# Patient Record
Sex: MALE | Race: BLACK OR AFRICAN AMERICAN
[De-identification: names, ages, dates, MRNs, and addresses within clinical notes are randomized per-mention and may not be internally consistent; named-entity substitution may affect disease eponyms.]

---

## 2024-05-17 ENCOUNTER — DASHBOARD ENCOUNTERS (OUTPATIENT)
Age: 44
End: 2024-05-17

## 2024-05-17 ENCOUNTER — OFFICE VISIT (OUTPATIENT)
Dept: URBAN - METROPOLITAN AREA CLINIC 25 | Facility: CLINIC | Age: 44
End: 2024-05-17
Payer: COMMERCIAL

## 2024-05-17 VITALS
BODY MASS INDEX: 31.02 KG/M2 | TEMPERATURE: 98.1 F | HEIGHT: 72 IN | HEART RATE: 69 BPM | WEIGHT: 229 LBS | DIASTOLIC BLOOD PRESSURE: 84 MMHG | SYSTOLIC BLOOD PRESSURE: 144 MMHG

## 2024-05-17 DIAGNOSIS — K58.2 IRRITABLE BOWEL SYNDROME WITH BOTH CONSTIPATION AND DIARRHEA: ICD-10-CM

## 2024-05-17 DIAGNOSIS — R19.4 BOWEL HABIT CHANGES: ICD-10-CM

## 2024-05-17 DIAGNOSIS — R19.7 DIARRHEA, UNSPECIFIED TYPE: ICD-10-CM

## 2024-05-17 PROBLEM — 10743008: Status: ACTIVE | Noted: 2024-05-17

## 2024-05-17 PROCEDURE — 99204 OFFICE O/P NEW MOD 45 MIN: CPT

## 2024-05-22 ENCOUNTER — LAB OUTSIDE AN ENCOUNTER (OUTPATIENT)
Dept: URBAN - METROPOLITAN AREA CLINIC 25 | Facility: CLINIC | Age: 44
End: 2024-05-22

## 2024-05-24 LAB
ADENOVIRUS F 40/41: NOT DETECTED
CAMPYLOBACTER: NOT DETECTED
CLOSTRIDIUM DIFFICILE: NOT DETECTED
ENTAMOEBA HISTOLYTICA: NOT DETECTED
ENTEROAGGREGATIVE E.COLI: NOT DETECTED
ENTEROTOXIGENIC E.COLI: NOT DETECTED
ESCHERICHIA COLI O157: NOT DETECTED
GIARDIA LAMBLIA: NOT DETECTED
NOROVIRUS GI/GII: NOT DETECTED
ROTAVIRUS A: NOT DETECTED
SALMONELLA SPP.: NOT DETECTED
SHIGA-LIKE TOXIN PRODUCING E.COLI: NOT DETECTED
SHIGELLA SPP. / ENTEROINVASIVE E.COLI: NOT DETECTED
VIBRIO PARAHAEMOLYTICUS: NOT DETECTED
VIBRIO SPP.: NOT DETECTED
YERSINIA ENTEROCOLITICA: NOT DETECTED

## 2024-05-30 ENCOUNTER — LAB OUTSIDE AN ENCOUNTER (OUTPATIENT)
Dept: URBAN - METROPOLITAN AREA CLINIC 25 | Facility: CLINIC | Age: 44
End: 2024-05-30

## 2024-06-03 ENCOUNTER — TELEPHONE ENCOUNTER (OUTPATIENT)
Dept: URBAN - METROPOLITAN AREA CLINIC 25 | Facility: CLINIC | Age: 44
End: 2024-06-03

## 2024-06-20 ENCOUNTER — OFFICE VISIT (OUTPATIENT)
Dept: URBAN - METROPOLITAN AREA CLINIC 25 | Facility: CLINIC | Age: 44
End: 2024-06-20
Payer: COMMERCIAL

## 2024-06-20 VITALS
HEIGHT: 72 IN | DIASTOLIC BLOOD PRESSURE: 88 MMHG | SYSTOLIC BLOOD PRESSURE: 136 MMHG | BODY MASS INDEX: 31.51 KG/M2 | HEART RATE: 73 BPM | WEIGHT: 232.6 LBS | TEMPERATURE: 97.2 F

## 2024-06-20 DIAGNOSIS — R19.7 OVERFLOW DIARRHEA: ICD-10-CM

## 2024-06-20 DIAGNOSIS — K59.09 CHRONIC CONSTIPATION: ICD-10-CM

## 2024-06-20 DIAGNOSIS — K58.2 IRRITABLE BOWEL SYNDROME WITH BOTH CONSTIPATION AND DIARRHEA: ICD-10-CM

## 2024-06-20 PROCEDURE — 99214 OFFICE O/P EST MOD 30 MIN: CPT

## 2024-06-20 NOTE — HPI-OTHER HISTORIES
05/24 OV  Mr. Hannah is a 44y M, he presents today w/ complaints of changes in bowels, dx'd w/ constipation in October which was treated w/ Dulcolax prn, no constipation as of late, notes he has been having increased urgency and notes his bowels are looser in consistency, notes associated abdominal discomfort which gets better w/ a BM. Hx of hemorrhoids, which was not treated. Notes complete evacuation w/ bowels usually in the morning, urgency increased when drinking coffee, Stool consistency is mushy, but not watery. 2-3 daily BM's, and notes associated with bloating as well usually with meals and beer. Patient notes mucus in his stool as well. Deneis famhx of colon CA/polyps, BRBPR, melena, unintentional weight loss, N/V.

## 2024-06-20 NOTE — HPI-TODAY'S VISIT:
06/24 OV  GPP stool study and fecal calpro were both unremarkable, KUB reveals constipation with increased stool burden throughout the colon and phleboliths in the pelvic area which may be caused from straining or may be contributing to constipation, patient was advised miralax prn and fiber. The patient notes after starting fiber supplementation, he notes that his bowels are more formed and less loose, however, they are still pieced up. The patient also reports more incomplete evacuations. Abdominal discomfort improved, usually better w/ a BM, notes abdominal gas and bloating. LLQ discomfort radiates throughout the colon and can feel it migrate. The patient notes mucus is still present in stool, but better w/ fiber. Deneis BRBPR, melena, unintetnional weight loss, wieght loss.

## 2024-08-28 ENCOUNTER — OFFICE VISIT (OUTPATIENT)
Dept: URBAN - METROPOLITAN AREA CLINIC 25 | Facility: CLINIC | Age: 44
End: 2024-08-28
Payer: COMMERCIAL

## 2024-08-28 VITALS
DIASTOLIC BLOOD PRESSURE: 87 MMHG | HEART RATE: 75 BPM | WEIGHT: 230.6 LBS | SYSTOLIC BLOOD PRESSURE: 128 MMHG | HEIGHT: 72 IN | TEMPERATURE: 97.7 F | BODY MASS INDEX: 31.23 KG/M2

## 2024-08-28 DIAGNOSIS — K59.09 CHRONIC CONSTIPATION: ICD-10-CM

## 2024-08-28 DIAGNOSIS — Z83.719 FAMILY HISTORY OF COLONIC POLYPS: ICD-10-CM

## 2024-08-28 DIAGNOSIS — R10.32 LLQ ABDOMINAL PAIN: ICD-10-CM

## 2024-08-28 PROCEDURE — 99214 OFFICE O/P EST MOD 30 MIN: CPT

## 2024-08-28 NOTE — HPI-OTHER HISTORIES
06/24 OV  GPP stool study and fecal calpro were both unremarkable, KUB reveals constipation with increased stool burden throughout the colon and phleboliths in the pelvic area which may be caused from straining or may be contributing to constipation, patient was advised miralax prn and fiber. The patient notes after starting fiber supplementation, he notes that his bowels are more formed and less loose, however, they are still pieced up. The patient also reports more incomplete evacuations. Abdominal discomfort improved, usually better w/ a BM, notes abdominal gas and bloating. LLQ discomfort radiates throughout the colon and can feel it migrate. The patient notes mucus is still present in stool, but better w/ fiber. Deneis BRBPR, melena, unintetnional weight loss, wieght loss.  05/24 OV  Mr. Hannah is a 44y M, he presents today w/ complaints of changes in bowels, dx'd w/ constipation in October which was treated w/ Dulcolax prn, no constipation as of late, notes he has been having increased urgency and notes his bowels are looser in consistency, notes associated abdominal discomfort which gets better w/ a BM. Hx of hemorrhoids, which was not treated. Notes complete evacuation w/ bowels usually in the morning, urgency increased when drinking coffee, Stool consistency is mushy, but not watery. 2-3 daily BM's, and notes associated with bloating as well usually with meals and beer. Patient notes mucus in his stool as well. Deneis famhx of colon CA/polyps, BRBPR, melena, unintentional weight loss, N/V.

## 2024-08-28 NOTE — HPI-TODAY'S VISIT:
08/24 OV  The patient increased his use of Miralax and fiber and admits to more regular bowels, notes he had a period of increased bloating which has since resolved as he continued to take fiber supplementation. Currently having a BM daily, the patient has not had to take Miralax for weeks, currently taking fiber supplementation BID. Famhx of colon polyps w/ his after, no famhx of colon CA/polyps. Patient does report occasional LLQ abdominal discomfort still which resolves on its own or after a good BM. [place] stool type 4 currently. Karma BRBPR, melena, unintentional weight loss, N/V, upper abdominal issues,

## 2024-09-12 ENCOUNTER — OFFICE VISIT (OUTPATIENT)
Dept: URBAN - METROPOLITAN AREA SURGERY CENTER 20 | Facility: SURGERY CENTER | Age: 44
End: 2024-09-12
Payer: COMMERCIAL

## 2024-09-12 DIAGNOSIS — R19.4 CHANGE IN BOWEL HABITS: ICD-10-CM

## 2024-09-12 DIAGNOSIS — K92.1 ACUTE MELENA: ICD-10-CM

## 2024-09-12 DIAGNOSIS — K92.1 HEMATOCHEZIA: ICD-10-CM

## 2024-09-12 DIAGNOSIS — K64.8 OTHER HEMORRHOIDS: ICD-10-CM

## 2024-09-12 PROCEDURE — 00811 ANES LWR INTST NDSC NOS: CPT | Performed by: NURSE ANESTHETIST, CERTIFIED REGISTERED

## 2024-09-12 PROCEDURE — 45378 DIAGNOSTIC COLONOSCOPY: CPT | Performed by: INTERNAL MEDICINE

## 2024-12-04 ENCOUNTER — OFFICE VISIT (OUTPATIENT)
Dept: URBAN - METROPOLITAN AREA CLINIC 25 | Facility: CLINIC | Age: 44
End: 2024-12-04
Payer: COMMERCIAL

## 2024-12-04 VITALS
SYSTOLIC BLOOD PRESSURE: 130 MMHG | TEMPERATURE: 97.7 F | HEIGHT: 72 IN | DIASTOLIC BLOOD PRESSURE: 82 MMHG | HEART RATE: 67 BPM | WEIGHT: 233.2 LBS | BODY MASS INDEX: 31.59 KG/M2

## 2024-12-04 DIAGNOSIS — Z83.719 FAMILY HISTORY OF COLONIC POLYPS: ICD-10-CM

## 2024-12-04 DIAGNOSIS — K58.2 IRRITABLE BOWEL SYNDROME WITH BOTH CONSTIPATION AND DIARRHEA: ICD-10-CM

## 2024-12-04 DIAGNOSIS — R10.13 DYSPEPSIA: ICD-10-CM

## 2024-12-04 PROCEDURE — 99213 OFFICE O/P EST LOW 20 MIN: CPT

## 2024-12-04 RX ORDER — FAMOTIDINE 40 MG/1
1 TABLET AT BEDTIME TABLET, FILM COATED ORAL ONCE A DAY
Qty: 90 TABLET | Refills: 3 | OUTPATIENT
Start: 2024-12-04

## 2024-12-04 NOTE — HPI-OTHER HISTORIES
08/24 OV  The patient increased his use of Miralax and fiber and admits to more regular bowels, notes he had a period of increased bloating which has since resolved as he continued to take fiber supplementation. Currently having a BM daily, the patient has not had to take Miralax for weeks, currently taking fiber supplementation BID. Famhx of colon polyps w/ his after, no famhx of colon CA/polyps. Patient does report occasional LLQ abdominal discomfort still which resolves on its own or after a good BM. [place] stool type 4 currently. Deneis BRBPR, melena, unintentional weight loss, N/V, upper abdominal issues,  06/24 OV  GPP stool study and fecal calpro were both unremarkable, KUB reveals constipation with increased stool burden throughout the colon and phleboliths in the pelvic area which may be caused from straining or may be contributing to constipation, patient was advised miralax prn and fiber. The patient notes after starting fiber supplementation, he notes that his bowels are more formed and less loose, however, they are still pieced up. The patient also reports more incomplete evacuations. Abdominal discomfort improved, usually better w/ a BM, notes abdominal gas and bloating. LLQ discomfort radiates throughout the colon and can feel it migrate. The patient notes mucus is still present in stool, but better w/ fiber. Deneis BRBPR, melena, unintetnional weight loss, wieght loss.  05/24 OV  Mr. Hannah is a 44y M, he presents today w/ complaints of changes in bowels, dx'd w/ constipation in October which was treated w/ Dulcolax prn, no constipation as of late, notes he has been having increased urgency and notes his bowels are looser in consistency, notes associated abdominal discomfort which gets better w/ a BM. Hx of hemorrhoids, which was not treated. Notes complete evacuation w/ bowels usually in the morning, urgency increased when drinking coffee, Stool consistency is mushy, but not watery. 2-3 daily BM's, and notes associated with bloating as well usually with meals and beer. Patient notes mucus in his stool as well. Deneis famhx of colon CA/polyps, BRBPR, melena, unintentional weight loss, N/V.

## 2024-12-04 NOTE — HPI-TODAY'S VISIT:
12/24 OV  S/p colonoscopy unremarkable, internal hemorrhoids present, 5 year recall, LLQ abdominal disocmfort has resolved, bowels have regulated, currently having a daily BM, no hard stools/straining. Occasional loose stools. Deneis BRBPR, melena, unintentional weigth loss, SOB/CP, abdominal pain. The patient does reports mild dyspepsia in the mornings, no reflux, but admits to sour taste in the AM. Occurring 2-3 times a month, usually in the AM or after dinner.    Colon 2024  The examined portion of the ileum was normal. The entire examined colon is normal. Internal hemorrhoids. No specimens collected.

## 2025-02-23 ENCOUNTER — WEB ENCOUNTER (OUTPATIENT)
Dept: URBAN - METROPOLITAN AREA CLINIC 25 | Facility: CLINIC | Age: 45
End: 2025-02-23

## 2025-02-25 ENCOUNTER — LAB OUTSIDE AN ENCOUNTER (OUTPATIENT)
Dept: URBAN - METROPOLITAN AREA CLINIC 25 | Facility: CLINIC | Age: 45
End: 2025-02-25

## 2025-03-11 ENCOUNTER — TELEPHONE ENCOUNTER (OUTPATIENT)
Dept: URBAN - METROPOLITAN AREA CLINIC 6 | Facility: CLINIC | Age: 45
End: 2025-03-11

## 2025-06-04 ENCOUNTER — OFFICE VISIT (OUTPATIENT)
Dept: URBAN - METROPOLITAN AREA CLINIC 25 | Facility: CLINIC | Age: 45
End: 2025-06-04

## 2025-07-23 ENCOUNTER — OFFICE VISIT (OUTPATIENT)
Dept: URBAN - METROPOLITAN AREA CLINIC 25 | Facility: CLINIC | Age: 45
End: 2025-07-23

## 2025-07-23 RX ORDER — SEMAGLUTIDE 0.68 MG/ML
AS DIRECTED INJECTION, SOLUTION SUBCUTANEOUS
Status: ACTIVE | COMMUNITY

## 2025-07-23 RX ORDER — FAMOTIDINE 40 MG/1
1 TABLET AT BEDTIME TABLET, FILM COATED ORAL ONCE A DAY
Qty: 90 TABLET | Refills: 3 | Status: ACTIVE | COMMUNITY
Start: 2024-12-04